# Patient Record
Sex: MALE | HISPANIC OR LATINO | URBAN - NONMETROPOLITAN AREA
[De-identification: names, ages, dates, MRNs, and addresses within clinical notes are randomized per-mention and may not be internally consistent; named-entity substitution may affect disease eponyms.]

---

## 2019-10-07 ENCOUNTER — IMPORTED ENCOUNTER (OUTPATIENT)
Dept: URBAN - NONMETROPOLITAN AREA CLINIC 1 | Facility: CLINIC | Age: 24
End: 2019-10-07

## 2019-10-07 PROCEDURE — 92310 CONTACT LENS FITTING OU: CPT

## 2019-10-07 PROCEDURE — S0620 ROUTINE OPHTHALMOLOGICAL EXA: HCPCS

## 2019-10-07 NOTE — PATIENT DISCUSSION
Myopia OU:-  educated patient on refractive status today-  new GLRx given today-  new CL trials (B&L Ultra) and Rx given today-  RTC 1 year; 's Notes: MR  3/14/16DFE  3/14/16

## 2022-04-15 ASSESSMENT — VISUAL ACUITY
OD_SC: 20/20
OS_SC: 20/20

## 2022-04-15 ASSESSMENT — TONOMETRY
OS_IOP_MMHG: 15
OD_IOP_MMHG: 15

## 2023-01-23 NOTE — PATIENT DISCUSSION
Cataract symptoms i.e., glare, blur discussed. Pt to call if worsening vision or trouble with driving, TV, reading, ADL. UV precautions. Reviewed possibility of future cataract surgery.
Glasses Prescription given to patient.
Instructed to call immediately if any new distortion, blurring, decreased vision or eye pain.
Patient made aware of 24/7 emergency services.
Patient understands condition, prognosis and need for follow up care.
Smoking cessation discussed.
18